# Patient Record
Sex: FEMALE | HISPANIC OR LATINO | URBAN - NONMETROPOLITAN AREA
[De-identification: names, ages, dates, MRNs, and addresses within clinical notes are randomized per-mention and may not be internally consistent; named-entity substitution may affect disease eponyms.]

---

## 2019-05-09 ENCOUNTER — IMPORTED ENCOUNTER (OUTPATIENT)
Dept: URBAN - NONMETROPOLITAN AREA CLINIC 1 | Facility: CLINIC | Age: 44
End: 2019-05-09

## 2019-05-09 PROBLEM — H52.13: Noted: 2017-10-11

## 2019-05-09 PROBLEM — H52.223: Noted: 2019-05-09

## 2019-05-09 PROCEDURE — 92015 DETERMINE REFRACTIVE STATE: CPT

## 2019-05-09 PROCEDURE — 92014 COMPRE OPH EXAM EST PT 1/>: CPT

## 2019-07-16 NOTE — PROCEDURE NOTE: SURGICAL
"<span style=""font-weight:bold;"">MR #:</span> [id number]<br /><br /><span style=""font-weight:bold;"">PREOPERATIVE DIAGNOSIS:</span> Cataract

## 2019-07-17 NOTE — PATIENT DISCUSSION
Cataract surgery has been performed in the first eye and activities of daily living are still impaired. The patient would like to proceed with cataract surgery in the second eye as scheduled. The patient elects IOL OS goal Emmetropia Basic+.

## 2019-07-23 NOTE — PROCEDURE NOTE: SURGICAL
"<span style=""font-weight:bold;"">MR #:</span> [id number]<br /><br /><span style=""font-weight:bold;"">PREOPERATIVE DIAGNOSIS:</span> Cataract

## 2019-07-23 NOTE — PATIENT DISCUSSION
Cataract surgery has been performed in the first eye and activities of daily living are still impaired. The patient would like to proceed with cataract surgery in the second eye as scheduled. The patient elects IOL OS goal Emmetropia Basic.

## 2020-05-14 ENCOUNTER — IMPORTED ENCOUNTER (OUTPATIENT)
Dept: URBAN - NONMETROPOLITAN AREA CLINIC 1 | Facility: CLINIC | Age: 45
End: 2020-05-14

## 2020-05-14 PROCEDURE — 92015 DETERMINE REFRACTIVE STATE: CPT

## 2020-05-14 PROCEDURE — 92014 COMPRE OPH EXAM EST PT 1/>: CPT

## 2020-05-14 NOTE — PATIENT DISCUSSION
Myopia Astigmatism OUDiscussed refractive status in detail with patient. New glasses Rx given today. Continue to monitor.

## 2021-06-03 ENCOUNTER — IMPORTED ENCOUNTER (OUTPATIENT)
Dept: URBAN - NONMETROPOLITAN AREA CLINIC 1 | Facility: CLINIC | Age: 46
End: 2021-06-03

## 2021-06-03 PROCEDURE — 92014 COMPRE OPH EXAM EST PT 1/>: CPT

## 2021-06-03 PROCEDURE — 92015 DETERMINE REFRACTIVE STATE: CPT

## 2022-04-09 ASSESSMENT — TONOMETRY
OS_IOP_MMHG: 17
OS_IOP_MMHG: 16
OD_IOP_MMHG: 17
OD_IOP_MMHG: 16
OS_IOP_MMHG: 18
OD_IOP_MMHG: 18

## 2022-04-09 ASSESSMENT — VISUAL ACUITY
OD_CC: 20/30
OD_SC: 20/20
OS_SC: 20/20
OS_CC: 20/60
OD_CC: 20/40
OS_CC: 20/50